# Patient Record
Sex: MALE | Race: WHITE | URBAN - METROPOLITAN AREA
[De-identification: names, ages, dates, MRNs, and addresses within clinical notes are randomized per-mention and may not be internally consistent; named-entity substitution may affect disease eponyms.]

---

## 2021-09-21 ENCOUNTER — OFFICE VISIT (OUTPATIENT)
Dept: URBAN - METROPOLITAN AREA CLINIC 72 | Facility: CLINIC | Age: 81
End: 2021-09-21
Payer: MEDICARE

## 2021-09-21 ENCOUNTER — WEB ENCOUNTER (OUTPATIENT)
Dept: URBAN - METROPOLITAN AREA CLINIC 72 | Facility: CLINIC | Age: 81
End: 2021-09-21

## 2021-09-21 VITALS — TEMPERATURE: 98 F | WEIGHT: 211 LBS | BODY MASS INDEX: 28.58 KG/M2 | HEIGHT: 72 IN

## 2021-09-21 DIAGNOSIS — K40.90 LEFT INGUINAL HERNIA: ICD-10-CM

## 2021-09-21 DIAGNOSIS — K64.5 THROMBOSED EXTERNAL HEMORRHOID: ICD-10-CM

## 2021-09-21 PROCEDURE — 99203 OFFICE O/P NEW LOW 30 MIN: CPT | Performed by: INTERNAL MEDICINE

## 2021-09-21 PROCEDURE — 46611 ANOSCOPY: CPT | Performed by: INTERNAL MEDICINE

## 2021-09-21 RX ORDER — ASCORBIC ACID 1000 MG
1 TABLET TABLET ORAL ONCE A DAY
Status: ACTIVE | COMMUNITY

## 2021-09-21 RX ORDER — ZINC 25 MG
1 TABLET TABLET ORAL ONCE A DAY
Status: ACTIVE | COMMUNITY

## 2021-09-21 RX ORDER — ASPIRIN 81 MG/1
1 TABLET TABLET ORAL ONCE A DAY
Status: ACTIVE | COMMUNITY

## 2021-09-21 RX ORDER — GLUCOSAMINE/D3/BOSWELLIA SERRA 1500MG-400
1 TABLET TABLET ORAL ONCE A DAY
Status: ACTIVE | COMMUNITY

## 2021-09-21 RX ORDER — GLUCOSAMINE SULFATE 500 MG
1 CAPSULE WITH A MEAL CAPSULE ORAL THREE TIMES A DAY
Status: ACTIVE | COMMUNITY

## 2021-09-21 RX ORDER — EZETIMIBE 10 MG/1
1 TABLET TABLET ORAL ONCE A DAY
Status: ACTIVE | COMMUNITY

## 2021-09-21 NOTE — HPI-TODAY'S VISIT:
Mister Flores is a 81-year-old male who presents as a new patient for evaluation of hemorrhoids.He reports that he began having issues with hemorrhoids for quite some time, underwent treatment for prostate cancer and had seeds placed, since then he has had issues with hemorrhoidal disease.  Initially noted bleeding that has since subsided.  Most recently he had an episode of dehydration and constipation, endorses excessive straining, his hemorrhoids bulged out and did not subside.  He was unable to manually get them to reinsert.  He notes pain and discomfort in his perianal areas since that time.  He denies any bleeding further, no other issues.  Also endorses Left inguinal pain and buldge.

## 2021-09-21 NOTE — EXAM-PHYSICAL EXAM
left inguinal buldge felt, no bowel sounds heard, non painful rectal: large protruding thrombosed hemorrhoid, non reducible, clot seen, no active bleeding. anoscopy with internal hemorrhoids, non thrombosed MA present as chaparone

## 2021-09-27 ENCOUNTER — LAB OUTSIDE AN ENCOUNTER (OUTPATIENT)
Dept: URBAN - METROPOLITAN AREA CLINIC 72 | Facility: CLINIC | Age: 81
End: 2021-09-27

## 2021-10-19 ENCOUNTER — OFFICE VISIT (OUTPATIENT)
Dept: URBAN - METROPOLITAN AREA CLINIC 72 | Facility: CLINIC | Age: 81
End: 2021-10-19
Payer: MEDICARE

## 2021-10-19 ENCOUNTER — WEB ENCOUNTER (OUTPATIENT)
Dept: URBAN - METROPOLITAN AREA CLINIC 72 | Facility: CLINIC | Age: 81
End: 2021-10-19

## 2021-10-19 ENCOUNTER — DASHBOARD ENCOUNTERS (OUTPATIENT)
Age: 81
End: 2021-10-19

## 2021-10-19 VITALS
TEMPERATURE: 97 F | HEART RATE: 66 BPM | DIASTOLIC BLOOD PRESSURE: 67 MMHG | HEIGHT: 72 IN | SYSTOLIC BLOOD PRESSURE: 135 MMHG | BODY MASS INDEX: 28.31 KG/M2 | WEIGHT: 209 LBS

## 2021-10-19 DIAGNOSIS — K64.5 THROMBOSED EXTERNAL HEMORRHOID: ICD-10-CM

## 2021-10-19 PROCEDURE — 99213 OFFICE O/P EST LOW 20 MIN: CPT | Performed by: INTERNAL MEDICINE

## 2021-10-19 PROCEDURE — 46611 ANOSCOPY: CPT | Performed by: INTERNAL MEDICINE

## 2021-10-19 RX ORDER — GLUCOSAMINE/D3/BOSWELLIA SERRA 1500MG-400
1 TABLET TABLET ORAL ONCE A DAY
Status: ACTIVE | COMMUNITY

## 2021-10-19 RX ORDER — ASCORBIC ACID 1000 MG
1 TABLET TABLET ORAL ONCE A DAY
Status: ACTIVE | COMMUNITY

## 2021-10-19 RX ORDER — EZETIMIBE 10 MG/1
1 TABLET TABLET ORAL ONCE A DAY
Status: ACTIVE | COMMUNITY

## 2021-10-19 RX ORDER — MULTIVITAMIN
1 TABLET TABLET ORAL ONCE A DAY
Status: ACTIVE | COMMUNITY

## 2021-10-19 RX ORDER — GLUCOSAMINE SULFATE 500 MG
1 CAPSULE WITH A MEAL CAPSULE ORAL THREE TIMES A DAY
Status: ACTIVE | COMMUNITY

## 2021-10-19 RX ORDER — ASCORBIC ACID 125 MG
AS DIRECTED TABLET,CHEWABLE ORAL
Status: ACTIVE | COMMUNITY

## 2021-10-19 RX ORDER — ASPIRIN 81 MG/1
1 TABLET TABLET ORAL ONCE A DAY
Status: ACTIVE | COMMUNITY

## 2021-10-19 RX ORDER — ZINC 25 MG
1 TABLET TABLET ORAL ONCE A DAY
Status: ACTIVE | COMMUNITY

## 2021-10-19 RX ORDER — NIACIN 500 MG
1 TABLET WITH FOOD TABLET ORAL ONCE A DAY
Status: ACTIVE | COMMUNITY

## 2021-10-19 NOTE — HPI-TODAY'S VISIT:
Mr. Flores returns for follow-up.  Last seen in our office on 9/21/2021.  He was seeing us for rectal pain found to have a thrombosed hemorrhoid in the process reabsorbing and nonthrombosed internal hemorrhoids.  Recommended that he continue with sits baths.  In addition he reported possibility of a hernia so an ultrasound was ordered.  His groin ultrasound revealed no obvious lesions or hernias.   He is feeling much better today, feels that things have improved no bleeding

## 2021-10-19 NOTE — EXAM-PHYSICAL EXAM
Rectal: Perianal exam was small reabsorbing external hemorrhoid no clot appreciated, digital rectal exam with firm prostate, brown stool in rectal vault no masses, anoscopy with grade 1 internal hemorrhoids in all plexuses.  Medical assistant present as chaperone